# Patient Record
Sex: MALE | Race: OTHER | Employment: UNEMPLOYED | URBAN - METROPOLITAN AREA
[De-identification: names, ages, dates, MRNs, and addresses within clinical notes are randomized per-mention and may not be internally consistent; named-entity substitution may affect disease eponyms.]

---

## 2024-03-30 ENCOUNTER — HOSPITAL ENCOUNTER (EMERGENCY)
Facility: HOSPITAL | Age: 49
Discharge: HOME | End: 2024-03-31
Attending: EMERGENCY MEDICINE

## 2024-03-30 DIAGNOSIS — T65.91XA: Primary | ICD-10-CM

## 2024-03-30 LAB
ALBUMIN SERPL-MCNC: 4.1 G/DL (ref 3.4–5)
ALBUMIN/GLOB SERPL: 1.1 {RATIO} (ref 1–2)
ALP LIVER SERPL-CCNC: 101 U/L
ALT SERPL-CCNC: 59 U/L
ANION GAP SERPL CALC-SCNC: 4 MMOL/L (ref 0–18)
APAP SERPL-MCNC: <2 UG/ML (ref 10–30)
ARTERIAL PATENCY WRIST A: POSITIVE
AST SERPL-CCNC: 42 U/L (ref 15–37)
BASE EXCESS BLDA CALC-SCNC: 3.5 MMOL/L (ref ?–2)
BILIRUB SERPL-MCNC: 0.5 MG/DL (ref 0.1–2)
BODY TEMPERATURE: 98.6 F
BUN BLD-MCNC: 15 MG/DL (ref 9–23)
CA-I BLD-SCNC: 1.13 MMOL/L (ref 0.95–1.32)
CALCIUM BLD-MCNC: 9.1 MG/DL (ref 8.5–10.1)
CHLORIDE SERPL-SCNC: 108 MMOL/L (ref 98–112)
CO2 SERPL-SCNC: 24 MMOL/L (ref 21–32)
COHGB MFR BLD: 2.1 % SAT (ref 0–3)
CREAT BLD-MCNC: 0.99 MG/DL
EGFRCR SERPLBLD CKD-EPI 2021: 94 ML/MIN/1.73M2 (ref 60–?)
ETHANOL SERPL-MCNC: <3 MG/DL (ref ?–3)
GLOBULIN PLAS-MCNC: 3.6 G/DL (ref 2.8–4.4)
GLUCOSE BLD-MCNC: 134 MG/DL (ref 70–99)
GLUCOSE BLD-MCNC: 147 MG/DL (ref 70–99)
HCO3 BLDA-SCNC: 26.9 MEQ/L (ref 21–27)
HGB BLD-MCNC: 14.7 G/DL
LACTATE BLD-SCNC: 1.6 MMOL/L (ref 0.5–2)
METHGB MFR BLD: 0.6 % SAT (ref 0.4–1.5)
OSMOLALITY SERPL CALC.SUM OF ELEC: 286 MOSM/KG (ref 275–295)
OXYHGB MFR BLDA: 70.2 % (ref 92–100)
PCO2 BLDA: 40 MM HG (ref 35–45)
PH BLDA: 7.45 [PH] (ref 7.35–7.45)
PO2 BLDA: 41 MM HG (ref 80–100)
POTASSIUM BLD-SCNC: 3.5 MMOL/L (ref 3.6–5.1)
POTASSIUM SERPL-SCNC: 3.6 MMOL/L (ref 3.5–5.1)
PROT SERPL-MCNC: 7.7 G/DL (ref 6.4–8.2)
SALICYLATES SERPL-MCNC: <1.7 MG/DL (ref 2.8–20)
SODIUM BLD-SCNC: 137 MMOL/L (ref 135–145)
SODIUM SERPL-SCNC: 136 MMOL/L (ref 136–145)

## 2024-03-30 PROCEDURE — 93005 ELECTROCARDIOGRAM TRACING: CPT

## 2024-03-30 PROCEDURE — 80143 DRUG ASSAY ACETAMINOPHEN: CPT | Performed by: EMERGENCY MEDICINE

## 2024-03-30 PROCEDURE — 83050 HGB METHEMOGLOBIN QUAN: CPT | Performed by: EMERGENCY MEDICINE

## 2024-03-30 PROCEDURE — 99285 EMERGENCY DEPT VISIT HI MDM: CPT

## 2024-03-30 PROCEDURE — 84295 ASSAY OF SERUM SODIUM: CPT | Performed by: EMERGENCY MEDICINE

## 2024-03-30 PROCEDURE — 80179 DRUG ASSAY SALICYLATE: CPT | Performed by: EMERGENCY MEDICINE

## 2024-03-30 PROCEDURE — 83605 ASSAY OF LACTIC ACID: CPT | Performed by: EMERGENCY MEDICINE

## 2024-03-30 PROCEDURE — 82803 BLOOD GASES ANY COMBINATION: CPT | Performed by: EMERGENCY MEDICINE

## 2024-03-30 PROCEDURE — 82962 GLUCOSE BLOOD TEST: CPT

## 2024-03-30 PROCEDURE — 85025 COMPLETE CBC W/AUTO DIFF WBC: CPT | Performed by: EMERGENCY MEDICINE

## 2024-03-30 PROCEDURE — 36600 WITHDRAWAL OF ARTERIAL BLOOD: CPT | Performed by: EMERGENCY MEDICINE

## 2024-03-30 PROCEDURE — 84132 ASSAY OF SERUM POTASSIUM: CPT | Performed by: EMERGENCY MEDICINE

## 2024-03-30 PROCEDURE — 93010 ELECTROCARDIOGRAM REPORT: CPT

## 2024-03-30 PROCEDURE — 82077 ASSAY SPEC XCP UR&BREATH IA: CPT | Performed by: EMERGENCY MEDICINE

## 2024-03-30 PROCEDURE — 80053 COMPREHEN METABOLIC PANEL: CPT | Performed by: EMERGENCY MEDICINE

## 2024-03-30 PROCEDURE — 82330 ASSAY OF CALCIUM: CPT | Performed by: EMERGENCY MEDICINE

## 2024-03-30 PROCEDURE — 83930 ASSAY OF BLOOD OSMOLALITY: CPT | Performed by: EMERGENCY MEDICINE

## 2024-03-30 PROCEDURE — 85018 HEMOGLOBIN: CPT | Performed by: EMERGENCY MEDICINE

## 2024-03-30 PROCEDURE — 36415 COLL VENOUS BLD VENIPUNCTURE: CPT

## 2024-03-30 PROCEDURE — 82375 ASSAY CARBOXYHB QUANT: CPT | Performed by: EMERGENCY MEDICINE

## 2024-03-31 VITALS
OXYGEN SATURATION: 99 % | HEART RATE: 63 BPM | WEIGHT: 171.94 LBS | BODY MASS INDEX: 24.07 KG/M2 | HEIGHT: 70.87 IN | TEMPERATURE: 97 F | DIASTOLIC BLOOD PRESSURE: 76 MMHG | SYSTOLIC BLOOD PRESSURE: 119 MMHG | RESPIRATION RATE: 14 BRPM

## 2024-03-31 LAB
ALBUMIN SERPL-MCNC: 3.9 G/DL (ref 3.4–5)
ALBUMIN/GLOB SERPL: 1 {RATIO} (ref 1–2)
ALP LIVER SERPL-CCNC: 100 U/L
ALT SERPL-CCNC: 59 U/L
AMPHET UR QL SCN: NEGATIVE
ANION GAP SERPL CALC-SCNC: 3 MMOL/L (ref 0–18)
ARTERIAL PATENCY WRIST A: POSITIVE
AST SERPL-CCNC: 34 U/L (ref 15–37)
ATRIAL RATE: 86 BPM
BASE EXCESS BLDA CALC-SCNC: 4.1 MMOL/L (ref ?–2)
BASOPHILS # BLD AUTO: 0.03 X10(3) UL (ref 0–0.2)
BASOPHILS NFR BLD AUTO: 0.4 %
BENZODIAZ UR QL SCN: NEGATIVE
BILIRUB SERPL-MCNC: 0.5 MG/DL (ref 0.1–2)
BODY TEMPERATURE: 98.6 F
BUN BLD-MCNC: 12 MG/DL (ref 9–23)
CA-I BLD-SCNC: 1.19 MMOL/L (ref 0.95–1.32)
CALCIUM BLD-MCNC: 8.9 MG/DL (ref 8.5–10.1)
CANNABINOIDS UR QL SCN: NEGATIVE
CHLORIDE SERPL-SCNC: 109 MMOL/L (ref 98–112)
CO2 SERPL-SCNC: 30 MMOL/L (ref 21–32)
COCAINE UR QL: NEGATIVE
COHGB MFR BLD: 2.2 % SAT (ref 0–3)
CREAT BLD-MCNC: 0.98 MG/DL
CREAT UR-SCNC: 70.3 MG/DL
EGFRCR SERPLBLD CKD-EPI 2021: 95 ML/MIN/1.73M2 (ref 60–?)
EOSINOPHIL # BLD AUTO: 0.05 X10(3) UL (ref 0–0.7)
EOSINOPHIL NFR BLD AUTO: 0.6 %
ERYTHROCYTE [DISTWIDTH] IN BLOOD BY AUTOMATED COUNT: 11.8 %
GLOBULIN PLAS-MCNC: 3.8 G/DL (ref 2.8–4.4)
GLUCOSE BLD-MCNC: 119 MG/DL (ref 70–99)
HCO3 BLDA-SCNC: 28.1 MEQ/L (ref 21–27)
HCT VFR BLD AUTO: 43.9 %
HGB BLD-MCNC: 14.6 G/DL
HGB BLD-MCNC: 15.5 G/DL
IMM GRANULOCYTES # BLD AUTO: 0.05 X10(3) UL (ref 0–1)
IMM GRANULOCYTES NFR BLD: 0.6 %
LACTATE BLD-SCNC: 1.2 MMOL/L (ref 0.5–2)
LACTATE SERPL-SCNC: 1.4 MMOL/L (ref 0.4–2)
LYMPHOCYTES # BLD AUTO: 1.87 X10(3) UL (ref 1–4)
LYMPHOCYTES NFR BLD AUTO: 22.3 %
MCH RBC QN AUTO: 30.9 PG (ref 26–34)
MCHC RBC AUTO-ENTMCNC: 35.3 G/DL (ref 31–37)
MCV RBC AUTO: 87.5 FL
MDMA UR QL SCN: NEGATIVE
METHGB MFR BLD: 0.9 % SAT (ref 0.4–1.5)
MONOCYTES # BLD AUTO: 0.41 X10(3) UL (ref 0.1–1)
MONOCYTES NFR BLD AUTO: 4.9 %
NEUTROPHILS # BLD AUTO: 5.99 X10 (3) UL (ref 1.5–7.7)
NEUTROPHILS # BLD AUTO: 5.99 X10(3) UL (ref 1.5–7.7)
NEUTROPHILS NFR BLD AUTO: 71.2 %
OPIATES UR QL SCN: NEGATIVE
OSMOLALITY SERPL CALC.SUM OF ELEC: 295 MOSM/KG (ref 275–295)
OSMOLALITY SERPL: 294 MOSM/KG (ref 280–300)
OSMOLALITY SERPL: 297 MOSM/KG (ref 280–300)
OXYCODONE UR QL SCN: NEGATIVE
OXYHGB MFR BLDA: 95.8 % (ref 92–100)
P AXIS: 21 DEGREES
P-R INTERVAL: 160 MS
PCO2 BLDA: 41 MM HG (ref 35–45)
PH BLDA: 7.45 [PH] (ref 7.35–7.45)
PLATELET # BLD AUTO: 186 10(3)UL (ref 150–450)
PO2 BLDA: 91 MM HG (ref 80–100)
POTASSIUM BLD-SCNC: 4.1 MMOL/L (ref 3.6–5.1)
POTASSIUM SERPL-SCNC: 4.2 MMOL/L (ref 3.5–5.1)
PROT SERPL-MCNC: 7.7 G/DL (ref 6.4–8.2)
Q-T INTERVAL: 348 MS
QRS DURATION: 100 MS
QTC CALCULATION (BEZET): 416 MS
R AXIS: -10 DEGREES
RBC # BLD AUTO: 5.02 X10(6)UL
SODIUM BLD-SCNC: 138 MMOL/L (ref 135–145)
SODIUM SERPL-SCNC: 142 MMOL/L (ref 136–145)
T AXIS: 40 DEGREES
VENTRICULAR RATE: 86 BPM
WBC # BLD AUTO: 8.4 X10(3) UL (ref 4–11)

## 2024-03-31 PROCEDURE — 85018 HEMOGLOBIN: CPT | Performed by: EMERGENCY MEDICINE

## 2024-03-31 PROCEDURE — 82803 BLOOD GASES ANY COMBINATION: CPT | Performed by: EMERGENCY MEDICINE

## 2024-03-31 PROCEDURE — 84132 ASSAY OF SERUM POTASSIUM: CPT | Performed by: EMERGENCY MEDICINE

## 2024-03-31 PROCEDURE — 83050 HGB METHEMOGLOBIN QUAN: CPT | Performed by: EMERGENCY MEDICINE

## 2024-03-31 PROCEDURE — 82330 ASSAY OF CALCIUM: CPT | Performed by: EMERGENCY MEDICINE

## 2024-03-31 PROCEDURE — 83605 ASSAY OF LACTIC ACID: CPT | Performed by: EMERGENCY MEDICINE

## 2024-03-31 PROCEDURE — 84295 ASSAY OF SERUM SODIUM: CPT | Performed by: EMERGENCY MEDICINE

## 2024-03-31 PROCEDURE — 82375 ASSAY CARBOXYHB QUANT: CPT | Performed by: EMERGENCY MEDICINE

## 2024-03-31 PROCEDURE — 85025 COMPLETE CBC W/AUTO DIFF WBC: CPT | Performed by: EMERGENCY MEDICINE

## 2024-03-31 PROCEDURE — 80053 COMPREHEN METABOLIC PANEL: CPT | Performed by: EMERGENCY MEDICINE

## 2024-03-31 PROCEDURE — 80307 DRUG TEST PRSMV CHEM ANLYZR: CPT | Performed by: EMERGENCY MEDICINE

## 2024-03-31 PROCEDURE — 83930 ASSAY OF BLOOD OSMOLALITY: CPT | Performed by: EMERGENCY MEDICINE

## 2024-03-31 PROCEDURE — 36600 WITHDRAWAL OF ARTERIAL BLOOD: CPT | Performed by: EMERGENCY MEDICINE

## 2024-03-31 RX ORDER — ACETAMINOPHEN 500 MG
1000 TABLET ORAL ONCE
Status: COMPLETED | OUTPATIENT
Start: 2024-03-31 | End: 2024-03-31

## 2024-03-31 NOTE — ED QUICK NOTES
Called Poison Control and spoke with KEYLA Hernandez. Rpt labs relayed. Case closed per Poison Control and this was relayed to Dr. Alaniz.

## 2024-03-31 NOTE — ED INITIAL ASSESSMENT (HPI)
[facility]  ED  EMERGENCY DEPARTMENT ENCOUNTER      Pt Name: Teresa Glez  MRN: [id number]  [place] 1982  Date of evaluation: 12/15/2021  Provider: Humberto Mota MD    CHIEF COMPLAINT       Chief Complaint   Patient presents with    Flank Pain     left side started at 1230. HISTORY OF PRESENT ILLNESS   (Location/Symptom, Timing/Onset,Context/Setting, Quality, Duration, Modifying Factors, Severity)  Note limiting factors. Teresa Glez is a 44 y.o. male who presents to the emergency department for severe pain left sided abdomen and back pain since about 1230 associated with urinary urgency. Did have lower back pain for a few days prior. Has a strange sensation to the penis. Nausea but no vomiting. No fever. No prior episodes. Nursing notes were reviewed. REVIEW OF SYSTEMS    (2-9 systems for level 4, 10 or more for level 5)     Review of Systems      PAST MEDICAL HISTORY   No past medical history on file. SURGICALHISTORY     No past surgical history on file. CURRENT MEDICATIONS       Discharge Medication List as of 12/15/2021  4:56 AM          ALLERGIES     Patient has no known allergies. FAMILY HISTORY     No family history on file. SOCIAL HISTORY       Social History     Socioeconomic History    Marital status:      Spouse name: Not on file    Number of children: Not on file    Years of education: Not on file    Highest education level: Not on file   Occupational History    Not on file   Tobacco Use    Smoking status: Never Smoker    Smokeless tobacco: Not on file   Substance and Sexual Activity    Alcohol use:  Yes    Drug use: Never    Sexual activity: Not on file   Other Topics Concern    Not on file   Social History Narrative    Not on file     Social Determinants of Health     Financial Resource Strain:     Difficulty of Paying Living Expenses: Not on file   Food Insecurity:     Worried About Running Out of Food Patient accidentally took a drink of antifreeze after accidentally placing left over antifreeze in regular cup and drinking after. Happened about 30min pta. Now feeling anxious and and lightheaded.     Denies SI.    in the Last Year: Not on file    Ran Out of Food in the Last Year: Not on file   Transportation Needs:     Lack of Transportation (Medical): Not on file    Lack of Transportation (Non-Medical): Not on file   Physical Activity:     Days of Exercise per Week: Not on file    Minutes of Exercise per Session: Not on file   Stress:     Feeling of Stress : Not on file   Social Connections:     Frequency of Communication with Friends and Family: Not on file    Frequency of Social Gatherings with Friends and Family: Not on file    Attends Jew Services: Not on file    Active Member of [address] [place] [address] [employer] or Organizations: Not on file    Attends Club or Organization Meetings: Not on file    Marital Status: Not on file   Intimate Partner Violence:     Fear of Current or Ex-Partner: Not on file    Emotionally Abused: Not on file    Physically Abused: Not on file    Sexually Abused: Not on file   Housing Stability:     Unable to Pay for Housing in the Last Year: Not on file    Number of [place] in the Last Year: Not on file    Unstable Housing in the Last Year: Not on file       SCREENINGS             PHYSICAL EXAM    (up to 7 for level 4, 8 or more for level 5)     ED Triage Vitals   BP Temp Temp Source Pulse Resp SpO2 Height Weight   12/15/21 0308 12/15/21 0305 12/15/21 0305 12/15/21 0308 12/15/21 0308 12/15/21 0308 12/15/21 0305 12/15/21 0305   (!) 150/94 97.8 °F (36.6 °C) Oral 65 16 99 % 6' 1\" (1.854 m) 185 lb (83.9 kg)       Physical Exam  Vitals and nursing note reviewed. Constitutional:       Appearance: Normal appearance. He is well-developed. He is not ill-appearing. HENT:      Head: Normocephalic and atraumatic. Right Ear: External ear normal.      Left Ear: External ear normal.      Nose: Nose normal.   Eyes:      General: No scleral icterus. Right eye: No discharge. Left eye: No discharge.       Conjunctiva/sclera: Conjunctivae normal.   Cardiovascular:      Rate and Rhythm: Normal rate and regular rhythm. Heart sounds: Normal heart sounds. Pulmonary:      Effort: Pulmonary effort is normal. No respiratory distress. Breath sounds: Normal breath sounds. No wheezing or rales. Abdominal:      General: Bowel sounds are normal.      Tenderness: There is abdominal tenderness in the left lower quadrant. There is left CVA tenderness. Musculoskeletal:      Cervical back: Neck supple. Skin:     Coloration: Skin is not pale. Neurological:      Mental Status: He is alert. Psychiatric:         Mood and Affect: Mood normal.         Behavior: Behavior normal.             DIAGNOSTIC RESULTS     EKG: All EKG's are interpreted by the Emergency Department Physician who either signs or Co-signs this chart in the absence of a cardiologist.    12 lead EKG shows     RADIOLOGY:   Non-plain film images such as CT, Ultrasound and MRI are read by the radiologist. Plain radiographic images are visualized and preliminarily interpreted by the emergency physician with the below findings:        Interpretation per the Radiologist below, if available at the time of this note:    CT ABDOMEN PELVIS WO CONTRAST Additional Contrast? None   Final Result   There is mild left-sided hydronephrosis and hydroureter with mild perinephric   and Jackie ureteral stranding. There is a 2 mm stone in the distal left ureter   just proximal to the left UVJ.   No stones remain in the left kidney      RECOMMENDATIONS:   Unavailable               ED BEDSIDE ULTRASOUND:   Performed by ED Physician - none    LABS:  Labs Reviewed   CBC WITH AUTO DIFFERENTIAL - Abnormal; Notable for the following components:       Result Value    Lymphocytes Absolute 0.8 (*)     All other components within normal limits    Narrative:     Performed at:  [facility]) [address], [address] [place] [address]   Phone [phone]   COMPREHENSIVE METABOLIC PANEL W/ REFLEX TO MG FOR LOW K - Abnormal; Notable for the following components:    Sodium 134 (*)     Chloride 96 (*)     CO2 19 (*)     Anion Gap 19 (*)     Glucose 128 (*)     All other components within normal limits    Narrative:     Performed at:  [address], [facility] [place]   Phone [phone]   URINE RT REFLEX TO CULTURE - Abnormal; Notable for the following components:    Ketones, Urine 15 (*)     Blood, Urine SMALL (*)     All other components within normal limits    Narrative:     Performed at:  [place] [address] [place]   Phone [phone]   MICROSCOPIC URINALYSIS - Abnormal; Notable for the following components:    Mucus, UA 2+ (*)     Bacteria, UA Rare (*)     All other components within normal limits    Narrative:     Performed at:  [place] [address] [place]   Phone [phone]   LIPASE    Narrative:     Performed at:  [address], [facility] [place]   Phone [phone]   SAMPLE POSSIBLE BLOOD BANK TESTING    Narrative:     Performed at:  [place] [address] [place]   Phone [phone]       All other labs were within normal range or not returned as of this dictation. EMERGENCY DEPARTMENT COURSE and DIFFERENTIAL DIAGNOSIS/MDM:   Vitals:    Vitals:    12/15/21 0305 12/15/21 0308   BP:  (!) 150/94   Pulse:  65   Resp:  16   Temp: 97.8 °F (36.6 °C)    TempSrc: Oral    SpO2:  99%   Weight: 185 lb (83.9 kg)    Height: 6' 1\" (1.854 m)              ED Course as of 12/15/21 0525   Wed Dec 15, 2021   0338 Adult male who comes in for left-sided flank pain. Based on the patient's description this sounds like a kidney stone although he has no history. Laboratory studies and a CT scan ordered. He is given Toradol and Zofran for his symptoms.  [TD]      ED Course User Index  [TD] Ronal Sams MD       Upon reassessment the patient continues to have pain he is therefore given morphine. Laboratory studies with no significant abnormality. He does have 2 mm stone in the left ureter. Patient is informed of our findings. The patient will be discharged. He will be given urology follow-up. CRITICAL CARE TIME   None       CONSULTS:  None    PROCEDURES:       Procedures    FINAL IMPRESSION      1. Ureterolithiasis          DISPOSITION/PLAN   DISPOSITION Decision To Discharge 12/15/2021 04:52:45 AM      PATIENT REFERREDTO:  [facility] [place] [address] [address] [phone]  Schedule an appointment as soon as possible for a visit         DISCHARGEMEDICATIONS:  Discharge Medication List as of 12/15/2021  4:56 AM      START taking these medications    Details   HYDROcodone-acetaminophen (NORCO) 5-325 MG per tablet Take 1 tablet by mouth every 8 hours as needed for Pain for up to 3 days. Intended supply: 3 days.  Take lowest dose possible to manage pain, Disp-6 tablet, R-0Print      ondansetron (ZOFRAN ODT) 4 MG disintegrating tablet Take 1 tablet by mouth every 8 hours as needed for Nausea or Vomiting, Disp-10 tablet, R-0Print                (Please note that portions of this note were completed with a voice recognition program.  Efforts were made to edit the dictations but occasionally words are mis-transcribed.)    Ronal Sams MD (electronically signed)  Attending Emergency Physician        Ronal Sams MD  12/15/21 5663

## 2024-03-31 NOTE — ED QUICK NOTES
Called Poison Control contacted and spoke with KEYLA Phillips. Case # [id number].    [employer]  - Danny #[id number].

## 2024-03-31 NOTE — ED PROVIDER NOTES
Patient Seen in: [facility] Emergency Department      History     Chief Complaint   Patient presents with    Poisoning/Overdose     Stated Complaint: accideantally took a drink of antifreeze about 30min pta    Subjective:   HPI    48-year-old male presenting emerged part for actual ingestion.  Patient states that he is taking it gulp of water when he excellently had a goal of antifreeze.  Presents emergency department proximately 10 minutes to 15 minutes afterwards.  He has a slight headache he denies any other exacerbating or relieving factors or associated symptoms.    Objective:   History reviewed. No pertinent past medical history.           Past Surgical History:   Procedure Laterality Date    APPENDECTOMY                  Social History     Socioeconomic History    Marital status: Single   Tobacco Use    Smoking status: Never    Smokeless tobacco: Never   Vaping Use    Vaping Use: Never used   Substance and Sexual Activity    Alcohol use: Never    Drug use: Never              Review of Systems    Positive for stated complaint: accideantally took a drink of antifreeze about 30min pta  Other systems are as noted in HPI.  Constitutional and vital signs reviewed.      All other systems reviewed and negative except as noted above.    Physical Exam     ED Triage Vitals [03/30/24 2254]   /90   Pulse 97   Resp 26   Temp 97.3 °F (36.3 °C)   Temp src Temporal   SpO2 98 %   O2 Device None (Room air)       Current:/71   Pulse 69   Temp 97.3 °F (36.3 °C) (Temporal)   Resp 13   Ht 180 cm (5' 10.87\")   Wt 78 kg   SpO2 98%   BMI 24.07 kg/m²         Physical Exam    Generally the patient is alert and oriented ×3 and appears in no distress  HEENT exam: Normal  Lungs are clear to auscultation bilaterally with no wheezes retractions or rhonchi noted  Cardiovascular exam shows a regular rate and rhythm  Abdomen soft nontender with no rebound tenderness noted  Extremity exam shows no clubbing cyanosis or  edema  Skin exam shows no rashes or lacerations  Neuro exam shows no focal deficits  Back exam shows no tenderness    ED Course     Labs Reviewed   COMP METABOLIC PANEL (14) - Abnormal; Notable for the following components:       Result Value    Glucose 147 (*)     AST 42 (*)     All other components within normal limits   ACETAMINOPHEN (TYLENOL), S - Abnormal; Notable for the following components:    Acetaminophen <2.0 (*)     All other components within normal limits   SALICYLATE, SERUM - Abnormal; Notable for the following components:    Salicylate <1.7 (*)     All other components within normal limits   ABG PANEL W ELECT AND LACTATE - Abnormal; Notable for the following components:    ABG pO2 41 (*)     ABG Base Excess 3.5 (*)     Arterial Blood Gas O2Hb 70.2 (*)     Potassium Blood Gas 3.5 (*)     All other components within normal limits   POCT GLUCOSE - Abnormal; Notable for the following components:    POC Glucose 134 (*)     All other components within normal limits   ETHYL ALCOHOL - Normal   LACTIC ACID, PLASMA - Normal   OSMOLALITY, SERUM - Normal   DRUG SCREEN 7 W/OUT CONFIRMATION, URINE    Narrative:     Results of the Urine Drug Screen should be used only for medical purposes.   RAINBOW DRAW LAVENDER   RAINBOW DRAW LIGHT GREEN   RAINBOW DRAW BLUE   RAINBOW DRAW GOLD     EKG    Rate, intervals and axes as noted on EKG Report.  Rate: 86  Rhythm: Sinus Rhythm  Reading: No areas of acute ST segment elevation or depression                 Differential diagnosis includes ethylene glycol poisoning methanol poisoning  A total of 35 minutes of critical care time (exclusive of billable procedures) was administered to manage the patient's  ethylene glycol ingestion accidental  due to his possible metabolic and respiratory effects.  This involved direct patient intervention, complex decision making, and/or extensive discussions with the patient, family, and clinical staff.           CATHIE                                          Medical Decision Making  48-year-old male presenting with accidental ethylene glycol ingestion.  Patient has an IV established cardiac monitor shows a sinus rhythm pulse ox shows no signs of hypoxia patiently was the patient care was immediately discussed with poison control.  They feel that his ingestion amount was low for initially receiving fomepizole.  We will start a series of labs and continue to reevaluate the patient.  CBC shows no significant anemia metabolic panel shows no anion gap serum osmolality is within acceptable limits.  Patient will be kept here another 6 hours when these labs will be repeated this show no worsening symptoms.  Patient remains asymptomatic at this time should the labs be within acceptable limits the patient will be discharged home otherwise patient will be admitted after discussion with poison control.    Amount and/or Complexity of Data Reviewed  Labs: ordered. Decision-making details documented in ED Course.  ECG/medicine tests: ordered and independent interpretation performed. Decision-making details documented in ED Course.        Disposition and Plan     Clinical Impression:  1. Poisoning, antifreeze, accidental or unintentional, initial encounter         Disposition:  There is no disposition on file for this visit.  There is no disposition time on file for this visit.    Follow-up:  No follow-up provider specified.        Medications Prescribed:  There are no discharge medications for this patient.

## 2024-03-31 NOTE — ED QUICK NOTES
Call from CARYN Phillips at poison control: labs read back= repeat chm Q 6 hrs and watch for acidosis.